# Patient Record
Sex: MALE
[De-identification: names, ages, dates, MRNs, and addresses within clinical notes are randomized per-mention and may not be internally consistent; named-entity substitution may affect disease eponyms.]

---

## 2019-01-04 ENCOUNTER — HOSPITAL ENCOUNTER (EMERGENCY)
Dept: HOSPITAL 42 - ED | Age: 39
Discharge: LEFT BEFORE DISCHARGE | End: 2019-01-04
Payer: SELF-PAY

## 2019-01-04 VITALS — HEART RATE: 100 BPM | SYSTOLIC BLOOD PRESSURE: 138 MMHG | OXYGEN SATURATION: 98 % | DIASTOLIC BLOOD PRESSURE: 89 MMHG

## 2019-01-04 VITALS — TEMPERATURE: 98.5 F | RESPIRATION RATE: 18 BRPM

## 2019-01-04 DIAGNOSIS — K56.609: ICD-10-CM

## 2019-01-04 DIAGNOSIS — Z90.49: ICD-10-CM

## 2019-01-04 DIAGNOSIS — K51.90: ICD-10-CM

## 2019-01-04 DIAGNOSIS — R10.9: Primary | ICD-10-CM

## 2019-01-04 LAB
ALBUMIN SERPL-MCNC: 4.7 G/DL (ref 3–4.8)
ALBUMIN/GLOB SERPL: 1.4 {RATIO} (ref 1.1–1.8)
ALT SERPL-CCNC: 42 U/L (ref 7–56)
AMYLASE SERPL-CCNC: 100 U/L (ref 35–125)
APPEARANCE UR: CLEAR
APTT BLD: 27.1 SECONDS (ref 25.1–36.5)
AST SERPL-CCNC: 39 U/L (ref 17–59)
BASOPHILS # BLD AUTO: 0.04 K/MM3 (ref 0–2)
BASOPHILS NFR BLD: 0.6 % (ref 0–3)
BILIRUB UR-MCNC: NEGATIVE MG/DL
BUN SERPL-MCNC: 16 MG/DL (ref 7–21)
CALCIUM SERPL-MCNC: 9.6 MG/DL (ref 8.4–10.5)
COLOR UR: YELLOW
EOSINOPHIL # BLD: 0.1 10*3/UL (ref 0–0.7)
EOSINOPHIL NFR BLD: 0.7 % (ref 1.5–5)
ERYTHROCYTE [DISTWIDTH] IN BLOOD BY AUTOMATED COUNT: 12.6 % (ref 11.5–14.5)
GFR NON-AFRICAN AMERICAN: > 60
GLUCOSE UR STRIP-MCNC: NEGATIVE MG/DL
GRANULOCYTES # BLD: 5.01 10*3/UL (ref 1.4–6.5)
GRANULOCYTES NFR BLD: 70.2 % (ref 50–68)
HGB BLD-MCNC: 15.9 G/DL (ref 14–18)
INR PPP: 0.95
LEUKOCYTE ESTERASE UR-ACNC: NEGATIVE LEU/UL
LIPASE SERPL-CCNC: 78 U/L (ref 23–300)
LYMPHOCYTES # BLD: 1.5 10*3/UL (ref 1.2–3.4)
LYMPHOCYTES NFR BLD AUTO: 21.1 % (ref 22–35)
MCH RBC QN AUTO: 31.1 PG (ref 25–35)
MCHC RBC AUTO-ENTMCNC: 34.6 G/DL (ref 31–37)
MCV RBC AUTO: 89.8 FL (ref 80–105)
MONOCYTES # BLD AUTO: 0.5 10*3/UL (ref 0.1–0.6)
MONOCYTES NFR BLD: 7.4 % (ref 1–6)
PH UR STRIP: 6 [PH] (ref 4.7–8)
PLATELET # BLD: 205 10^3/UL (ref 120–450)
PMV BLD AUTO: 9.5 FL (ref 7–11)
PROT UR STRIP-MCNC: NEGATIVE MG/DL
PROTHROMBIN TIME: 10.9 SECONDS (ref 9.4–12.5)
RBC # BLD AUTO: 5.12 10^6/UL (ref 3.5–6.1)
RBC # UR STRIP: NEGATIVE /UL
SP GR UR STRIP: 1.02 (ref 1–1.03)
TROPONIN I SERPL-MCNC: < 0.01 NG/ML
UROBILINOGEN UR STRIP-ACNC: 0.2 E.U./DL
WBC # BLD AUTO: 7.1 10^3/UL (ref 4.5–11)

## 2019-01-04 PROCEDURE — 71045 X-RAY EXAM CHEST 1 VIEW: CPT

## 2019-01-04 PROCEDURE — 85025 COMPLETE CBC W/AUTO DIFF WBC: CPT

## 2019-01-04 PROCEDURE — 99284 EMERGENCY DEPT VISIT MOD MDM: CPT

## 2019-01-04 PROCEDURE — 93005 ELECTROCARDIOGRAM TRACING: CPT

## 2019-01-04 PROCEDURE — 87804 INFLUENZA ASSAY W/OPTIC: CPT

## 2019-01-04 PROCEDURE — 83690 ASSAY OF LIPASE: CPT

## 2019-01-04 PROCEDURE — 85730 THROMBOPLASTIN TIME PARTIAL: CPT

## 2019-01-04 PROCEDURE — 96375 TX/PRO/DX INJ NEW DRUG ADDON: CPT

## 2019-01-04 PROCEDURE — 87086 URINE CULTURE/COLONY COUNT: CPT

## 2019-01-04 PROCEDURE — 74177 CT ABD & PELVIS W/CONTRAST: CPT

## 2019-01-04 PROCEDURE — 96376 TX/PRO/DX INJ SAME DRUG ADON: CPT

## 2019-01-04 PROCEDURE — 96374 THER/PROPH/DIAG INJ IV PUSH: CPT

## 2019-01-04 PROCEDURE — 84484 ASSAY OF TROPONIN QUANT: CPT

## 2019-01-04 PROCEDURE — 96361 HYDRATE IV INFUSION ADD-ON: CPT

## 2019-01-04 PROCEDURE — 82150 ASSAY OF AMYLASE: CPT

## 2019-01-04 PROCEDURE — 81003 URINALYSIS AUTO W/O SCOPE: CPT

## 2019-01-04 PROCEDURE — 85610 PROTHROMBIN TIME: CPT

## 2019-01-04 PROCEDURE — 80053 COMPREHEN METABOLIC PANEL: CPT

## 2019-01-04 NOTE — CARD
--------------- APPROVED REPORT --------------





Date of service: 01/04/2019



EKG Measurement

Heart [id number]

NC [id number]

[id number]

[id number]

[id number]



<Conclusion>

Sinus tachycardia

Otherwise normal ECG

## 2019-01-04 NOTE — ED PDOC
Arrival/HPI





- General


Chief Complaint: Abdominal Pain





- History of Present Illness


Narrative History of Present Illness (Text): 


37 y/o male with PMH of ulcerative colitis [s/p colectomy with J pouch] and SBO 

presents to ED c/o abdominal pain x 2 days. Pain is sharp, located primarily in 

upper abdomen without radiation. Associated nausea today with decreased PO 

intake. States this feels similar to prior SBO. Having BM per baseline, last 

this morning. States he drank a redbull this morning. Denies fevers, chills, 

diarrhea, vomiting, chest pain, SOB, hematemesis, back pain, neck pain, 

headache, dizziness, calf pain, calf swelling, or any other associated 

complaints. 








Past Medical History





- Gastrointestinal


Hx Colitis: Yes





- Psychiatric


Hx Substance Use: No





- Surgical History


Other/Comment: large intestine removed.  bowel obstruction





- Anesthesia


Hx Anesthesia: Yes


Hx Anesthesia Reactions: No


Hx Malignant Hyperthermia: No





Family/Social History





- Physician Review


Nursing Documentation Reviewed: Yes


Family/Social History: No Known Family HX


Smoking Status: Never Smoked


Hx Alcohol Use: No


Hx Substance Use: No





Allergies/Home Meds


Allergies/Adverse Reactions: 


Allergies





No Known Allergies Allergy (Verified 01/04/19 10:27)


   











Review of Systems





- Physician Review


All systems were reviewed & negative as marked: Yes





- Review of Systems


Constitutional: Normal.  absent: Fevers


Eyes: Normal.  absent: Vision Changes


ENT: Normal.  absent: Sore Throat, Sinus Congestion


Respiratory: Normal.  absent: SOB, Cough


Cardiovascular: Normal.  absent: Chest Pain, Palpitations


Gastrointestinal: Abdominal Pain, Diarrhea, Nausea, Appetite Changes.  absent: 

Stool Changes, Constipation, Vomiting, Hematochezia, Food Intolerance


Genitourinary Male: Normal.  absent: Dysuria, Frequency


Musculoskeletal: Normal.  absent: Arthralgias, Back Pain


Skin: Normal.  absent: Rash


Neurological: Normal.  absent: Headache, Dizziness


Endocrine: Normal.  absent: Diaphoresis


Hemo/Lymphatic: Normal


Psychiatric: Normal





Physical Exam


Vital Signs Reviewed: Yes





Vital Signs











  Temp Pulse Resp BP Pulse Ox


 


 01/04/19 12:04   94 H  18  138/94 H  97


 


 01/04/19 09:44  98.5 F  116 H  18  148/97 H  97











Temperature: Afebrile


Blood Pressure: Normal


Pulse: Regular


Respiratory Rate: Normal


Appearance: Positive for: Well-Appearing, Non-Toxic, Comfortable


Pain Distress: None


Mental Status: Positive for: Alert and Oriented X 3





- Systems Exam


Head: Present: Atraumatic, Normocephalic


Pupils: Present: PERRL


Extroacular Muscles: Present: EOMI


Conjunctiva: Present: Normal


Mouth: Present: Moist Mucous Membranes


Neck: Present: Normal Range of Motion


Respiratory/Chest: Present: Clear to Auscultation, Good Air Exchange.  No: 

Respiratory Distress, Accessory Muscle Use


Cardiovascular: Present: Regular Rate and Rhythm, Normal S1, S2.  No: Murmurs


Abdomen: Present: Tenderness (epigastric, periumbilibal), Normal Bowel Sounds.  

No: Distention, Peritoneal Signs, Rebound, Guarding


Back: Present: Normal Inspection


Upper Extremity: Present: Normal Inspection, Normal ROM, NORMAL PULSES, 

Neurovascularly Intact, Capillary Refill < 2s.  No: Cyanosis, Edema


Lower Extremity: Present: Normal Inspection, NORMAL PULSES, Normal ROM, 

Neurovascularly Intact.  No: Edema


Neurological: Present: GCS=15, CN II-XII Intact, Speech Normal, Motor Func 

Grossly Intact, Normal Sensory Function, Gait Normal


Skin: Present: Warm, Dry, Normal Color.  No: Rashes


Psychiatric: Present: Alert, Oriented x 3, Normal Insight, Normal Concentration,

Normal Affect, Normal Mood





Medical Decision Making


ED Course and Treatment: 


Initial Plan:


* CBC, CMP


* Lipase


* Coags


* UA, culture


* EKG


* CXR


* CT Abd/Pelvis with IV contrast


* Toradol


* IVF


* Zofran





Labwork unremarkable


EKG shows snius tachycardia, otherwise normal; troponin negative


CXR shows no active disease, no free air


Patient's pain unrelieved with Toradol, will give morphine.





Reports resolution of pain with morphine.





Although heart rate has improved marginally with fluids, patient continues to be

tachycardic. Continues to deny chest pain/pressure/heaviness or SOB. Not 

hypoxic. Pt very well appearing. Denies substance use. Admits to drinking 

redbull this morning. Case discussed with ED attending Dr. Dunn, who 

recommends no further laboratory testing, but instead another liter of fluid and

reevaluation. Patient unwilling to stay for further IVF, observation, or 

evaluation. Asking to sign out AMA. 





The patient is choosing to leave against medical advice.  I have personally 

explained to the patient that choosing to do so may result in permanent bodily 

harm, disability, or death.  I have discussed at great length that without 

further evaluation and monitoring there may be unforeseen circumstances and/or 

deterioration causing permanent bodily harm or death as a result of their 

choice. The patient is alert, oriented, and shows the mental capacity to make 

clear decisions regarding the patients health care at this time. The patient 

continues to wish to leave against medical advice.  


In light of the patients decision to leave against medical advice, follow-up 

has been arranged and the patient is aware of the importance to following up as 

instructed.  The patient has been advised that they should return to the 

emergency room immediately if they change their mind at any time, or if their 

condition begins to change or worsen in any way.





Patient advised to followup as soon as possible with PMD or clinic within 2 

days, as well as with GI specialist. Patient states he will followup as soon as 

possible. Advised to return with any new/worsening symptoms











- Lab Interpretations


Lab Results: 











                                 01/04/19 11:05 





                                 01/04/19 11:27 





                                   Lab Results





01/04/19 13:52: Influenza Typ A,B (EIA) Negative for flu a/b


01/04/19 11:27: Sodium 137, Potassium 3.9, Chloride 101, Carbon Dioxide 26, 

Anion Gap 13, BUN 16, Creatinine 0.8, Est GFR (African Amer) > 60, Est GFR (Non-

Af Amer) > 60, Random Glucose 100, Calcium 9.6, Total Bilirubin 0.6, AST 39, ALT

42, Alkaline Phosphatase 96, Troponin I < 0.01, Total Protein 7.9, Albumin 4.7, 

Globulin 3.3, Albumin/Globulin Ratio 1.4, Amylase 100, Lipase 78


01/04/19 11:27: Urine Color Yellow, Urine Appearance Clear, Urine pH 6.0, Ur 

Specific Gravity 1.025, Urine Protein Negative, Urine Glucose (UA) Negative, 

Urine Ketones Negative, Urine Blood Negative, Urine Nitrate Negative, Urine 

Bilirubin Negative, Urine Urobilinogen 0.2, Ur Leukocyte Esterase Negative


01/04/19 11:05: PT 10.9, INR 0.95, APTT 27.1


01/04/19 11:05: WBC 7.1, RBC 5.12, Hgb 15.9, Hct 46.0, MCV 89.8, MCH 31.1, MCHC 

34.6, RDW 12.6, Plt Count 205, MPV 9.5, Gran % 70.2 H, Lymph % (Auto) 21.1 L, 

Mono % (Auto) 7.4 H, Eos % (Auto) 0.7 L, Baso % (Auto) 0.6, Gran # 5.01, Lymph #

(Auto) 1.5, Mono # (Auto) 0.5, Eos # (Auto) 0.1, Baso # (Auto) 0.04








I have reviewed the lab results: Yes





- RAD Interpretation


Narrative RAD Interpretations (Text): 


CXR: 


No active disease





CT Abd/Pelvis with IV contrast:


Impression: No small bowel obstruction. Evidence of subtotal colectomy with 

ileosigmoid anastomosis.


Radiology Orders: 











01/04/19 10:59


ABD & PELVIS IV CONTRAST ONLY [CT] Stat 





01/04/19 12:01


CHEST PORTABLE [RAD] Stat 











: Radiologist





- EKG Interpretation


EKG Interpretation (Text): 


Rate 112; sinus tachycardia; normal intervals; No STEMI or other signs of 

ischemia


Interpreted by ED Physician: Yes


Type: 12 lead EKG


Comparison: No previous EKG avail.





- Medication Orders


Current Medication Orders: 














Discontinued Medications





Famotidine (Pepcid)  20 mg IVP STAT STA


   Stop: 01/04/19 11:00


   Last Admin: 01/04/19 11:15  Dose: 20 mg





IVP Administration


 Document     01/04/19 11:15  LA  (Rec: 01/04/19 11:15  LA  [id number])


     Charges for Administration


      # of IVP Administrations                   1





Sodium Chloride (Sodium Chloride 0.9%)  1,000 mls @ 999 mls/hr IV .Q1H1M STA


   Stop: 01/04/19 12:01


   Last Admin: 01/04/19 11:17  Dose: 999 mls/hr





eMAR Start Stop


 Document     01/04/19 11:17  LA  (Rec: 01/04/19 11:17  LA  [id number])


     Intravenous Solution


      Start Date                                 01/04/19


      Start Time                                 11:17


      End Date                                   01/04/19


      End time                                   12:18


      Total Infusion Time                        61





Ketorolac Tromethamine (Toradol)  15 mg IVP STAT STA


   Stop: 01/04/19 11:00


   Last Admin: 01/04/19 11:15  Dose: 15 mg





MAR Pain Assessment


 Document     01/04/19 11:15  LA  (Rec: 01/04/19 11:16  LA  [id number])


     Pain Reassessment


      Is this a pain reassessment?               No


     Sleep


      Is patient sleeping during reassessment?   No


     Presence of Pain


      Presence of Pain                           Yes


     Location


      Pain Location Body Site                    Abdomen


IVP Administration


 Document     01/04/19 11:15  LA  (Rec: 01/04/19 11:16  LA  [id number])


     Charges for Administration


      # of IVP Administrations                   1





Morphine Sulfate (Morphine)  2 mg IVP STAT STA


   Stop: 01/04/19 12:08


   Last Admin: 01/04/19 12:15  Dose: 2 mg





MAR Pain Assessment


 Document     01/04/19 12:15  LA  (Rec: 01/04/19 12:15  LA  [id number])


     Pain Reassessment


      Is this a pain reassessment?               Yes


     Sleep


      Is patient sleeping during reassessment?   No


     Presence of Pain


      Presence of Pain                           Yes


     Pain Scale Used


     Protocol:  [facility]


      Pain Scale Used                            Numeric


     Location


      Pain Location Body Site                    Abdomen


     Description


      Intensity of Pain at present               8


IVP Administration


 Document     01/04/19 12:15  LA  (Rec: 01/04/19 12:15  LA  [id number])


     Charges for Administration


      # of IVP Administrations                   1





Morphine Sulfate (Morphine)  2 mg IVP STAT STA


   Stop: 01/04/19 14:24


   Last Admin: 01/04/19 14:41  Dose: 2 mg





MAR Pain Assessment


 Document     01/04/19 14:41  LA  (Rec: 01/04/19 14:41  LA  [id number])


     Pain Reassessment


      Is this a pain reassessment?               Yes


     Sleep


      Is patient sleeping during reassessment?   No


     Pain Scale Used


     Protocol:  [facility]


      Pain Scale Used                            Numeric


     Location


      Pain Location Body Site                    Abdomen


IVP Administration


 Document     01/04/19 14:41  LA  (Rec: 01/04/19 14:41  LA  [id number])


     Charges for Administration


      # of IVP Administrations                   1





Ondansetron HCl (Zofran Inj)  4 mg IVP STAT STA


   Stop: 01/04/19 11:00


   Last Admin: 01/04/19 11:14  Dose: 4 mg





IVP Administration


 Document     01/04/19 11:14  LA  (Rec: 01/04/19 11:14  LA  [id number])


     Charges for Administration


      # of IVP Administrations                   1














Disposition/Present on Arrival





- Present on Arrival


Any Indicators Present on Arrival: No


History of DVT/PE: No


History of Uncontrolled Diabetes: No


Urinary Catheter: No


History of Decub. Ulcer: No


History Surgical Site Infection Following: None





- Disposition


Have Diagnosis and Disposition been Completed?: No


Diagnosis: 


 Abdominal pain, Left against medical advice





Disposition: AGAINST MEDICAL ADVICE


Disposition Time: 15:00


Condition: IMPROVED


Discharge Instructions (ExitCare):  Acute Abdomen (Belly Pain), Nausea and 

Vomiting, Adult (DC), Leaving Against Medical Advice


Additional Instructions: 


Increase fluids 


[place] diet


Followup with GI within 2 days


Followup with primary doctor within 2 days


Please return if you wish to be re-evaluated or if new/worsening symptoms appear


Referrals: 


[facility] [place] [Outside] - Follow up with primary


Xavi Godwin MD [Medical Doctor] - Follow up with primary


Debra Victor MD [Medical Doctor] - Follow up with primary


Forms:  [facility] (English), WORK NOTE

## 2019-01-04 NOTE — CT
Date of service: 01/04/2019



PROCEDURE:  CT Abdomen and Pelvis with contrast



HISTORY:

abdominal pain, h/o obstruction, h/o colectomy



COMPARISON:

None available



TECHNIQUE:

Contrast dose: 100 mL Omnipaque 350 IV



Radiation dose:



Total exam DLP = 674.44 mGy-cm.



This CT exam was performed using one or more of the following dose 

reduction techniques: Automated exposure control, adjustment of the 

mA and/or kV according to patient size, and/or use of iterative 

reconstruction technique.



FINDINGS:



LOWER THORAX:

No visible consolidation, pleural effusion, or pneumothorax.



LIVER:

Unremarkable. 



GALLBLADDER AND BILE DUCTS:

Unremarkable. 



PANCREAS:

Unremarkable. 



SPLEEN:

Unremarkable. 



ADRENALS:

Unremarkable. 



KIDNEYS AND URETERS:

The kidneys enhance symmetrically. No hydronephrosis or obstructing 

calculus identified. 



VASCULATURE:

No aortic aneurysm. No atherosclerotic calcification or mural plaque 

present.



BOWEL:

Stomach is nondistended.  



Lack of oral contrast limits evaluation for bowel pathology. No 

evidence of small-bowel obstruction. Evidence of subtotal colectomy 

with ileosigmoid anastomosis. 



APPENDIX:



PERITONEUM:

No significant free fluid.  No definite free air. 



LYMPH NODES:

No bulky adenopathy identified. 



BLADDER:

Unremarkable. 



REPRODUCTIVE:

Unremarkable. 



BONES:

No acute osseous abnormality is detected. 



OTHER FINDINGS:

None.



IMPRESSION:

No evidence of small-bowel obstruction. Incidental findings as above.

## 2019-01-04 NOTE — RAD
HISTORY:

 abdominal pain 



COMPARISON:

None available. 



TECHNIQUE:

Chest, one view.



FINDINGS:





LUNGS:

Mild right basilar atelectasis/infiltrate.  No focal consolidation.



Please note that chest x-ray has limited sensitivity for the 

detection of pulmonary masses.



PLEURA:

No significant pleural effusion identified. No definite pneumothorax .



CARDIOVASCULAR:

Heart size appears within normal limits.  No significant 

atherosclerotic calcification present. 



OSSEOUS STRUCTURES:

No acute osseous abnormality identified.



VISUALIZED UPPER ABDOMEN:

Unremarkable.



OTHER FINDINGS:

None.



IMPRESSION:

Mild bibasilar atelectasis/infiltrate.